# Patient Record
Sex: MALE | Race: WHITE | NOT HISPANIC OR LATINO
[De-identification: names, ages, dates, MRNs, and addresses within clinical notes are randomized per-mention and may not be internally consistent; named-entity substitution may affect disease eponyms.]

---

## 2022-09-15 PROBLEM — Z00.00 ENCOUNTER FOR PREVENTIVE HEALTH EXAMINATION: Status: ACTIVE | Noted: 2022-09-15

## 2022-09-20 ENCOUNTER — APPOINTMENT (OUTPATIENT)
Dept: NEUROSURGERY | Facility: CLINIC | Age: 62
End: 2022-09-20

## 2022-09-20 PROCEDURE — 99205 OFFICE O/P NEW HI 60 MIN: CPT

## 2022-09-20 NOTE — ASSESSMENT
[FreeTextEntry1] : Mr. Balderrama presents with MRI brain from 8/22/22 reviewed independently by me which demonstrates a subcentimeter, nonenhancing, FLAIR hyperintense right frontal lobe cortical/subcortical lesion without significant mass effect or subjacent vasogenic edema. Potential etiologies most consistent with cortical dysplasia, low grade cortical neoplasm, possible sequelae of previous trauma. \par \par Alternative management strategies discussed in detail. Neurousurgical intervention is not recommended at this time. Surveillance MRI brain with and without gadolinium has been requested for November, which will be a 3 month follow up time point, to assess for any interval progression. I plan to see him following surveillance MRI. The patient understands that if the lesion progresses on surveillance imaging, surgical resection will likely be recommended.\par \par I have asked the patient to contact me for any symptomatic development or progression in the interim at which time we can obtain expedited follow up imaging.\par \par A total of 60 minutes were spent relative to this encounter.\par \par

## 2022-09-20 NOTE — DATA REVIEWED
[de-identified] : MRI BRAIN WITH CONTRAST: 8/22/22: IMPRESSION- There is no evidence enhancing lesion. Potential etiologies for the \par right frontal lobe cortical lesion include a cortical dysplasia, low-grade \par cortical neoplasm versus other etiologies. \par   \par Suggest follow-up MRI brain without and with contrast in 3 months and \par neurosurgical consultation. \par MRI BRAIN WITHOUT CONTRAST: 8/15/22: IMPRESSION: Nonspecific subcentimeter signal abnormality/lesion right medial \par frontal lobe cortex. Further evaluation with contrast-enhanced brain MRI \par recommended.
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
## 2022-09-28 DIAGNOSIS — G93.9 DISORDER OF BRAIN, UNSPECIFIED: ICD-10-CM

## 2022-11-29 ENCOUNTER — APPOINTMENT (OUTPATIENT)
Dept: NEUROSURGERY | Facility: CLINIC | Age: 62
End: 2022-11-29
Payer: COMMERCIAL

## 2022-11-29 VITALS
RESPIRATION RATE: 12 BRPM | HEART RATE: 65 BPM | OXYGEN SATURATION: 98 % | SYSTOLIC BLOOD PRESSURE: 120 MMHG | HEIGHT: 75 IN | DIASTOLIC BLOOD PRESSURE: 80 MMHG | BODY MASS INDEX: 21.88 KG/M2 | WEIGHT: 176 LBS

## 2022-11-29 PROCEDURE — 99215 OFFICE O/P EST HI 40 MIN: CPT

## 2022-11-29 NOTE — ASSESSMENT
[FreeTextEntry1] : Mr. Tobar presents with surveillance MRI brain 11/9/22 reviewed independently by me which demonstrates a stable , nonenhancing, FLAIR hyperintense, cortical/subcortical lesion in the medial right frontal lobe without significant mass effect or subjacent vasogenic edema when compared to imaging from 8/22/22. Imaging findings are most consistent with cortical dysplasia, though low grade neoplasm or possible sequelae of previous trauma cannot be definitively ruled out. Neurosurgical intervention is not recommended at this time. I plan to obtain MRI brain with and without gadolinium in 6 months with an appointment to follow. \par \par I have asked the patient to contact me for any symptomatic development or progression in the interim at which time we can obtain expedited follow up imaging.\par \par A total of 60 minutes were spent relative to this encounter.\par \par \par

## 2022-11-29 NOTE — DATA REVIEWED
[de-identified] : MRI BRAIN WITH AND WITHOUT CONTRAST: 11/9/22: IMPRESSION- Stable nonenhancing lesion in the parasagittal right frontal lobe. Finding is \par nonspecific with differential again including, though not limited to, cortical \par dysplasia and low-grade neoplasm. Recommend continued follow-up at a clinically \par appropriate interval, perhaps 6 months.
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
## 2024-09-13 ENCOUNTER — APPOINTMENT (OUTPATIENT)
Dept: VASCULAR SURGERY | Facility: CLINIC | Age: 64
End: 2024-09-13
Payer: COMMERCIAL

## 2024-09-13 PROCEDURE — 99204 OFFICE O/P NEW MOD 45 MIN: CPT

## 2024-10-11 ENCOUNTER — APPOINTMENT (OUTPATIENT)
Dept: VASCULAR SURGERY | Facility: CLINIC | Age: 64
End: 2024-10-11
Payer: COMMERCIAL

## 2024-10-11 PROCEDURE — 99213 OFFICE O/P EST LOW 20 MIN: CPT
